# Patient Record
Sex: MALE | Race: WHITE
[De-identification: names, ages, dates, MRNs, and addresses within clinical notes are randomized per-mention and may not be internally consistent; named-entity substitution may affect disease eponyms.]

---

## 2020-07-23 NOTE — XMS REPORT
Continuity of Care Document

                            Created on:2020



Patient:LUCINA OLIVAS

Sex:Male

:1965

External Reference #:[id number]





Demographics







                          Address                   [address]



                                                    [place] [address]

 

                          Home Phone                [phone]

 

                          Work Phone                [phone]

 

                          Email Address             [email or web address]

 

                          Preferred Language        en

 

                          Marital Status            Unknown

 

                          Temple Affiliation     Unknown

 

                          Race                      Unknown

 

                          Additional Race(s)        Unavailable

 

                          Ethnic Group              Unknown









Author







                          Organization              [facility]

t

 

                          Address                   [address]



                                                    [place] [address]

 

                          Phone                     [phone]









Care Team Providers







                    Name                Role                Phone

 

                    Unavailable         Unavailable         Unavailable









Problems







       Condition Condition Condition Status Onset  Resolution Last   Treating Co

mments 

Source



       Name   Details Category        Date   Date   Treatment Clinician        



                                                 Date                 

 

       Osteoarthr Osteoarthr Problem Active                                    [facility]

[facility]



       itis of itis of                                                  [facility]



       right  right                                                   [facility]



       hand,  hand,                                                   l



       unspecifie unspecifie                                                  Ou

tpati



       d      d                                                       ent



       osteoarthr osteoarthr                                                  Cl

inics



       itis type itis type                                                  

 

       HTN, goal HTN, goal Problem Active                                    [facility]



       below  below                                                   [facility]



       140/90 140/90                                                  [facility]



                                                                      l



                                                                      [facility]



                                                                      [facility]

 

       Generalize Generalize Problem Active                                    C

[facility]



       d anxiety d anxiety                                                  [facility]

s -



       disorder disorder                                                  [facility]

a



                                                                      l



                                                                      [facility]



                                                                      [facility]

 

       Chronic Chronic Problem Active                                    [facility]



       obstructiv obstructiv                                                  Diya

kes -



       e      e                                                       [facility]



       pulmonary pulmonary                                                  l



       disease, disease,                                                  Outpat

i



       unspecifie unspecifie                                                  en

t



       d COPD d COPD                                                  Clinics



       type   type                                                    

 

       Bilateral Bilateral Problem Active                                    [facility]



       primary primary                                                  [facility]



       osteoarthr osteoarthr                                                  Me

moria



       itis of itis of                                                  l



       knee   knee                                                    [facility]



                                                                      [facility]

 

       Chronic Chronic Problem Active                                    [facility]



       hepatitis hepatitis                                                  [facility]

s -



       C without C without                                                  Servando

holden



       hepatic hepatic                                                  l



       coma   coma                                                    [facility]



                                                                      [facility]

 

       Tobacco Tobacco Problem Active                                    [facility]



       use    use                                                     [facility]



       disorder disorder                                                  [facility]

a



                                                                      l



                                                                      [facility]



                                                                      [facility]

 

       Primary Primary Diagnosis Active                                    [facility]

t



       osteoarthr osteoarthr                                                  Diya

kes -



       itis of itis of                                                  [facility]



       right knee right knee                                                  l



                                                                      [facility]



                                                                      [facility]

 

       Loose body Loose body Diagnosis Active                                   

 [facility]



       in knee, in knee,                                                  [facility] 

-



       right knee right knee                                                  Me

moria



                                                                      l



                                                                      [facility]



                                                                      [facility]

 

       Primary Primary Diagnosis Active                                    [facility]

t



       osteoarthr osteoarthr                                                  Diya

kes -



       itis of itis of                                                  [facility]



       left knee left knee                                                  l



                                                                      [facility]



                                                                      [facility]

 

       Pain in Pain in Diagnosis Active                                    [facility]

t



       joint of joint of                                                  [facility] 

-



       left knee left knee                                                  Serv[facility]

holden



                                                                      l



                                                                      [facility]



                                                                      [facility]

 

       Pain of Pain of Diagnosis Active                                    [facility]

t



       joint of joint of                                                  [facility] 

-



       left ankle left ankle                                                  Me

moria



       and foot and foot                                                  l



                                                                      [facility]



                                                                      [facility]

 

       Pain in Pain in Diagnosis Active                                    [facility]

t



       joint of joint of                                                  [facility] 

-



       right knee right knee                                                  Me

moria



                                                                      l



                                                                      [facility]



                                                                      [facility]



                                                                      Clinics







Allergies, Adverse Reactions, Alerts

This patient has no known allergies or adverse reactions.



Medications







       Ordered Filled Start  Stop   Current Ordering Indication Dosage Frequency

 Signature

                    Comments            Components          Source



     Medication Medication Date Date Medication? Clinician                (SIG) 

          



     Name Name                                                   

 

     Tramadol Tramadol 2019      Yes  Jian                1 tablet         

  [facility]



     HCl  HCl  0-22           Riddle                               [facility]



               00:00:                                              [facility]



               00                                                l



                                                                 [facility]



                                                                 [facility]

 

     ProAir HFA ProAir HFA       Yes  Jian                2 puffs as   

        [facility]



               -           Riddle                needed           [facility]



               00:00:                                              [facility]



               00                                                l



                                                                 [facility]



                                                                 [facility]

 

     Breo Breo  2020- No   Jian                1 puff           [facility]



     Ellipta Ellipta 8- 04-08      Riddle                               [facility]



               00:00: 00:00                                         [facility]



               00   :00                                          l



                                                                 [facility]



                                                                 [facility]



                                                                 [facility]

 

     Duexis Duexis           Yes  Jian                1 tablet           [facility]



                              [facility]                               [facility]



                                                                 [facility]



                                                                 [facility]



                                                                 ent



                                                                 Clinics

 

     Fish Oil Fish Oil           Yes  Jian                1 capsule          

 [facility]



     Omega-3 Omega-3                Riddle                               [facility]



                                                                 l



                                                                 [facility]



                                                                 [facility]



                                                                 [facility]

 

     Chantix Chantix           Yes  Jian                as             [facility]



     Starting Starting                Riddle                directed           January

es -



     Month Peter Month Peter                                                   [facility]

ia



                                                                 l



                                                                 [facility]



                                                                 [facility]



                                                                 [facility]

 

     [facility]           Yes  Jian                as             [facility]



                              Riddle                directed           [facility]



                                                                 [facility]



                                                                 [facility]



                                                                 [facility]

 

     Zinc Zinc           Yes  Jian                1 tablet           [facility]



                              [facility]iddle                               [facility]



                                                                 [facility]



                                                                 l



                                                                 [facility]



                                                                 [facility]



                                                                 Cl[facility]ics

 

     Multivitami Multivitami           Yes  Jian                as           

  [facility]



     n Adults n Adults                Riddle                directed           January

es -



     50+  50+                                                    [facility]



                                                                 l



                                                                 [facility]



                                                                 [facility]



                                                                 [facility]

 

     Fluocinolon Fluocinolon           Yes  Jian                1            

  [facility]



     e Acetonide e Acetonide                Riddle                applicatio      

     [facility]



                                                  n to           [facility]



                                                  affected           l



                                                  area           [facility]



                                                                 [facility]



                                                                 [facility]

 

     Gabapentin Gabapentin           Yes  Jian                1 capsule      

     [facility]



                              [facility]                               [facility]



                                                                 [facility]



                                                                 l



                                                                 [facility]



                                                                 [facility]



                                                                 Clinics

 

     Tumersaid Tumersaid           Yes  Jian                as             [facility]

[facility]



                              Riddle                directed           [facility]



                                                                 [facility]



                                                                 l



                                                                 [facility]



                                                                 [facility]



                                                                 [facility]

 

     Chantix Chantix           Yes  Jian                1 tablet           [facility]

[facility]



     Continuing Continuing                Riddle                               January

es -



     Month Peter Month Peter                                                   [facility]

ia



                                                                 l



                                                                 [facility]



                                                                 ent



                                                                 Clinics

 

     Acetaminoph Acetaminoph           Yes  Jian                1 capsule    

       [facility]



     en   en                  Riddle                as needed           [facility]

 

     Healthy Healthy           Yes  Jian                as             [facility]



     Eyes/Lutein Eyes/Lutein                Riddle                directed        

   [facility]

 

     Lidocaine Lidocaine           Yes  Jian                as             [facility]

[facility]



     HCl  HCl                 Riddle                directed           [facility]

 

     Calcipotrie Calcipotrie           Yes  Jian                1            

  [facility]



     ne   ne                  Riddle                applicatio           [facility]



                                                  [facility]           [facility]



                                                                 [facility]



                                                                 [facility]







Procedures

This patient has no known procedures.



Encounters







        Start   End     Encounter Admission Attending Care    Care    Encounter 

Source



        Date/Time Date/Time Type    Type    Clinicians Facility Department ID   

   

 

        2020-07-15 2020-07-15 Outpatient                 [facility]

[id number] [facility]



        12:00:00 12:00:00                         t Bone  Bone and         [facility]

 -



                                                and Joint Joint           [facility]

a



                                                [facility]         ent



                                                                        Clinics

 

        2020-07-15 2020-07-15 Outpatient                 [facility]

[id number] [facility]



        10:16:00 10:16:00                         t Bone  B[facility] and         [facility]

 -



                                                and Joint Joint           [facility]

a



                                                [facility]         ent



                                                                        [facility]

 

        2020 Outpatient                 [facility]

[id number] [facility]



        08:00:00 08:00:00                         t Bone  Bone and         [facility]

 -



                                                and Joint Joint           [facility]

a



                                                [facility]         ent



                                                                        Clinics

 

        2020 Outpatient                 [facility]

[id number] [facility]



        09:53:00 09:53:00                         t [place]   [facility]



                                                                        ent



                                                                        Clinics

 

        2020 Outpatient                 [facility]

[id number] [facility]



        10:28:00 10:28:00                         t Appbyme



                                                [facility]



                                                                        ent



                                                                        Clinics

 

        2020 Outpatient                 [facility]

[id number] [facility]



        08:30:00 08:30:00                         t Appbyme



                                                [facility]



                                                                        ent



                                                                        Clinics

 

        2020 2020 Outpatient                 [facility]

[id number] [facility]



        14:27:00 14:27:00                         t Bone  Bone and         [facility]

 -



                                                and Joint Joint           [facility]

a



                                                Clinic of Clinic of         [facility]         ent



                                                                        Clinics

 

        2020 Outpatient                 [facility]

[id number] [facility]



        10:00:00 10:00:00                         t Bone  B[facility] and         [facility]

 -



                                                and Joint Joint           [facility]

a



                                                [facility]         ent



                                                                        Clinics

 

        2020 Outpatient                 [facility]

[id number] [facility]



        10:15:00 10:15:00                         t Oak   [facility]

s [employer]   [facility]



                                                                        ent



                                                                        Clinics

 

        2019 Outpatient                 [facility]

[id number] [facility]



        11:38:00 11:38:00                         t Oak   [facility]   [facility]



                                                                        ent



                                                                        Clinics

 

        2019 Outpatient                 [facility]

[id number] [facility]



        10:38:00 10:38:00                         t Oak   [facility]

s -



                                                [address]   [facility]



                                                                        ent



                                                                        Clinics

 

        2019 Outpatient                 [facility]

[id number] [facility]



        08:30:00 08:30:00                         t Bone  B[facility] and         [facility]

 -



                                                and Joint Joint           [facility]

a



                                                [facility]         [facility]         ent



                                                                        Clinics

 

        2019 Outpatient                 [facility]

[id number] [facility]



        14:20:00 14:20:00                         t Oak   [employer]

s [employer]apo   [facility]



                                                                        ent



                                                                        Clinics

 

        2019 Outpatient                 [facility]

[id number] [facility]



        10:00:00 10:00:00                         t Oak   [facility]

s -



                                                [address]   [facility]



                                                                        ent



                                                                        Clinics

 

        2019 Outpatient                 [facility]

[id number] [facility]



        09:45:00 09:45:00                         t Oak   [facility]

s [facility]   [facility]



                                                                        ent



                                                                        Clinics

 

        2019 Outpatient                 [facility]

[id number] [facility]



        08:30:00 08:30:00                         t Bone  [facility]         [facility]

 -



                                                [facility]           [facility]

a



                                                [facility]         ent



                                                                        Clinics

 

        2019-10-22 2019-10-22 Outpatient                 [facility]

[id number] [facility]



        09:30:00 09:30:00                         t Bone  [facility]         [facility]

 -



                                                [facility]           [facility]

a



                                                [facility]         ent



                                                                        Clinics

 

        2019-10-16 2019-10-16 Outpatient                 [facility]

[id number] [facility]



        11:00:00 11:00:00                         t Bone  [facility]         [facility]

 -



                                                [facility]           [facility]

a



                                                [facility]         ent



                                                                        [facility]

 

        2019-10-08 2019-10-08 Outpatient                 [facility]

[id number] [facility]



        13:52:00 13:52:00                         t Oak   [address]         [facility]

s -



                                                [facility]



                                                                        ent



                                                                        [facility]

 

        2019-10-07 2019-10-07 Outpatient                 [facility]

[id number] [facility]



        10:30:00 10:30:00                         [facility]



                                                [facility]



                                                                        ent



                                                                        [facility]

 

        2019-10-03 2019-10-03 Outpatient                 [facility]

[id number] [facility]



        18:36:00 18:36:00                         t Oak   [facility]         [facility]

s -



                                                [facility]



                                                                        ent



                                                                        [facility]

 

        2019-10-03 2019-10-03 Outpatient                 [facility] [address]

[id number] [facility]



        09:48:00 09:48:00                         t [facility]   [facility]

s [facility]



                                                                        ent



                                                                        [facility]

 

        2019-10-02 2019-10-02 Outpatient                 [facility]

[id number] [facility]



        08:00:00 08:00:00                         t Bone  [facility]         [facility]

 -



                                                [facility]           [facility]

a



                                                [facility]         ent



                                                                        Clinics

 

        2019 Outpatient                 [facility]

[id number] [facility]



        08:00:00 08:00:00                         t Bone  [facility]         [facility]

 -



                                                and [facility]           [facility]

a



                                                [facility]         ent



                                                                        [facility]







Results

This patient has no known results.

## 2020-07-23 NOTE — RAD REPORT
EXAM DESCRIPTION:  CT - Chest For Pe Angio - 7/23/2020 9:26 am

 

CLINICAL HISTORY:  Chest pain.

Dyspnea;Cough;Fever

 

COMPARISON:  No comparisons

 

TECHNIQUE:  CT angiogram of the pulmonary arteries was performed with MIP.

 

All CT scans are performed using dose optimization technique as appropriate and may include automated
 exposure control or mA/KV adjustment according to patient size.

 

FINDINGS:  No evidence of pulmonary thromboembolism.

 

No acute aortic finding demonstrated.

 

Prominent bullous emphysematous changes are present. Moderate airspace opacity is present in the righ
t upper lobe laterally compatible with pneumonia.

 

No significant pericardial or pleural fluid.

 

No concerning bony finding.

 

IMPRESSION:  No evidence of pulmonary thromboembolism.

 

Moderate pneumonia right upper lobe laterally.

 

Prominent bullous emphysema.

## 2020-07-23 NOTE — XMS REPORT
[facility]

                            Created on:July 15, 2020



Patient:Jefferson Rangel

Sex:Male

:1965

External Reference #:[id number]





Demographics







                          Address                   [address] [place] [address]

 

                          Phone                     [phone]

 

                          Preferred Language        en

 

                          Marital Status            Unknown

 

                          Rastafari Affiliation     Unknown

 

                          Race                      Unknown

 

                          Ethnic Group              Unknown









Author







                          Organization              [employer]









Care Team Providers







                    Name                Role                Phone

 

                    Jian Riddle       Provider Role       Unavailable









Allergies

No Known Allergies



Problems







             Problem Type Condition    Code         Onset Dates  Condition Statu

s

 

             Problem      Generalized anxiety disorder F41.1                    

 Active

 

             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                  

   Active



                          knees                                  

 

             Problem      Chronic obstructive pulmonary J44.9                   

  Active



                          disease, unspecified COPD type                        

   

 

             Problem      Tobacco use disorder F17.200                   Active

 

             Problem      Chronic hepatitis C without hepatic B18.2             

        Active



                          coma                                   

 

             Problem      Loose body in knee, right knee M23.41                 

   Active

 

             Problem      Primary osteoarthritis of left knee M17.12            

        Active

 

             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             

        Active



                          knee                                   

 

             Problem      Arthritis of hand, right M19.041                   Act

brendan

 

             Problem      HTN, goal below 140/90 I10                       Activ

e

 

             Problem      Primary osteoarthritis of right M17.11                

    Active



                          knee                                   

 

             Problem      Osteoarthritis of right hand, M19.041                 

  Active



                          unspecified osteoarthritis type                       

    







Medications

No Known Medications



Results

No Known Results



Summary Purpose

[employer] Submission

## 2020-07-23 NOTE — XMS REPORT
[facility]

                            Created on:July 15, 2020



Patient:Jefferson Rangel

Sex:Male

:1965

External Reference #:[id number]





Demographics







                          Address                   [address] [place] [address]

 

                          Phone                     [phone]

 

                          Preferred Language        en

 

                          Marital Status            Unknown

 

                          Rastafarian Affiliation     Unknown

 

                          Race                      Unknown

 

                          Ethnic Group              Unknown









Author







                          Organization              [employer]









Care Team Providers







                    Name                Role                Phone

 

                    Lalo Jorge A       Provider Role       Unavailable









Allergies

No Known Allergies



Problems







             Problem Type Condition    Code         Onset Dates  Condition Statu

s

 

             Problem      Generalized anxiety disorder F41.1                    

 Active

 

             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                  

   Active



                          knees                                  

 

             Problem      Chronic obstructive pulmonary J44.9                   

  Active



                          disease, unspecified COPD type                        

   

 

             Problem      Tobacco use disorder F17.200                   Active

 

             Problem      Chronic hepatitis C without hepatic B18.2             

        Active



                          coma                                   

 

             Problem      Loose body in knee, right knee M23.41                 

   Active

 

             Problem      Primary osteoarthritis of left knee M17.12            

        Active

 

             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             

        Active



                          knee                                   

 

             Problem      Arthritis of hand, right M19.041                   Act

brendan

 

             Problem      HTN, goal below 140/90 I10                       Activ

e

 

             Problem      Primary osteoarthritis of right M17.11                

    Active



                          knee                                   

 

             Problem      Osteoarthritis of right hand, M19.041                 

  Active



                          unspecified osteoarthritis type                       

    







Medications

No Known Medications



Results

No Known Results



Summary Purpose

[employer] Submission

## 2020-07-23 NOTE — XMS REPORT
[facility]

                            Created on:2020



Patient:Jefferson Rangel

Sex:Male

:1965

External Reference #:[id number]





Demographics







                          Address                   [address] [place] [address]

 

                          Phone                     [phone]

 

                          Preferred Language        en

 

                          Marital Status            Unknown

 

                          Lutheran Affiliation     Unknown

 

                          Race                      Unknown

 

                          Ethnic Group              Unknown









Author







                          Organization              [employer]









Care Team Providers







                    Name                Role                Phone

 

                    Lalo Jorge A       Provider Role       Unavailable









Allergies, Adverse Reactions, Alerts







                    Substance           Reaction            Event Type

 

                    N.K.D.A.            Info Not Available  Non Drug Allergy







Problems







             Problem Type Condition    Code         Onset Dates  Condition Statu

s

 

             Problem      Tobacco use disorder F17.200                   Active

 

             Problem      Chronic obstructive pulmonary J44.9                   

  Active



                          disease, unspecified COPD type                        

   

 

             Problem      Osteoarthritis of right hand, M19.041                 

  Active



                          unspecified osteoarthritis type                       

    

 

             Assessment   Hand pain, right M79.641                   Active

 

             Problem      Arthritis of hand, right M19.041                   Act

brendan

 

             Problem      Primary osteoarthritis of right M17.11                

    Active



                          knee                                   

 

             Problem      Generalized anxiety disorder F41.1                    

 Active

 

             Problem      Chronic hepatitis C without B18.2                     

Active



                          hepatic coma                           

 

             Problem      HTN, goal below 140/90 I10                       Activ

e

 

             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                  

   Active



                          knees                                  

 

             Assessment   Chronic hepatitis C without B18.2                     

Active



                          hepatic coma                           

 

             Assessment   Generalized anxiety disorder F41.1                    

 Active

 

             Assessment   Tobacco use disorder F17.200                   Active

 

             Assessment   Closed fracture of left ankle with S82.892D           

       Active



                          routine healing, subsequent                           



                          encounter                              

 

             Assessment   Weakness     R53.1                     Active

 

             Assessment   Chronic obstructive pulmonary J44.9                   

  Active



                          disease, unspecified COPD type                        

   

 

             Assessment   History of falling Z91.81                    Active

 

             Assessment   HTN, goal below 140/90 I10                       Activ

e

 

             Assessment   Osteoarthritis of right hand, M19.041                 

  Active



                          unspecified osteoarthritis type                       

    

 

             Assessment   Primary osteoarthritis of both M17.0                  

   Active



                          knees                                  







Medications







        Medication Code    Code    Instructions Start   End     Status  Dosage



                System                  Date    Date            

 

        ProAir HFA NDC     83351633957 108 (90 Base)                 Active  2 p

uffs as



                                MCG/ACT                         needed



                                Inhalation                         



                                every 6 hrs                         

 

        Fluocinolone NDC     23805034104 0.01 %                  Active  1 appli

cation



        Acetonide                 Externally                         to affected



                                Twice a day                         area

 

        Ginkoba NDC     60292050351 40 MG Orally                 Active  as dire

cted

 

        Multivitamin NDC     74287645677 - Orally                 Active  as dir

ected



        Adults 50+                                                 

 

        Zinc    NDC     30829638603 50 MG Orally                 Active  1 table

t



                                Once a day                         

 

        Chantix Starting NDC     83622287112 0.5 MG X 11 &                 Activ

e  as directed



        Month Peter                 1 MG X 42                         



                                Orally use as                         



                                directed                         

 

        Healthy NDC     07921144962 - Orally                 Active  as directed



        Eyes/Lutein                                                 

 

        Lidocaine HCl NDC     25[address]-73814 2.5 %                   Active  as dir

ected



                                Externally                         

 

        Tumersaid NDC     89896883559 - Orally                 Active  as direct

ed

 

        Duexis  NDC     54852075242 800-26.6 MG                 Active  1 tablet



                                Orally Three                         



                                times a day                         



                                PRN PAIN                         

 

        Breo Ellipta NDC     04022199474 200-25 MCG/INH                 Active  

1 puff



                                Inhalation                         



                                Once a day                         

 

        Fish Oil [place]-3 NDC     32173142983 1000 MG Orally                 Acti

ve  1 capsule



                                Once a day                         

 

        Acetaminophen NDC     [address]-73 500 MG Orally                 Active

  1 capsule as



                                every 6 hrs                         needed

 

        Chantix NDC     60987237277 1 MG Orally                 Active  1 tablet



        Continuing Month                 Twice a day                         



        Peter                                                     

 

        Tramadol HCl NDC     52556978691 50 MG PO Q 6 Oct 22,         Active  1 

tablet



                                hrs PRN Pain 2019                    

 

        Calcipotriene NDC     21828027254 0.005 %                 Active  1 appl

ication



                                Externally                         to affected



                                Twice a day                         area

 

        Gabapentin NDC     57764949405 600 MG Orally                 Active  1 c

apsule



                                TID                             







Results

No Known Results



Summary Purpose

eClinicalWorks Submission

## 2020-07-23 NOTE — ER
Nurse's Notes                                                                                     

 [facility]                                                                 

Name: Jefferson Rangel                                                                           

Age: 55 yrs                                                                                       

Sex: Male                                                                                         

: 1965                                                                                   

MRN: [id number]                                                                                   

Arrival Date: 2020                                                                          

Time: 07:33                                                                                       

Account#: [id number]                                                                            

Bed 15                                                                                            

Private MD: Jorge A May                                                                         

Diagnosis: Pneumonia, unspecified organism-RUL;Emphysema                                          

                                                                                                  

Presentation:                                                                                     

                                                                                             

07:43 Chief complaint: Patient states: painful cough, shortness of breath and fever that      ss  

      began 6 days ago. Pt also reports bloody sputum. Coronavirus screen: Patient reports a      

      cough. Patient reports shortness of breath or difficulty breathing. Patient reports a       

      measured and/or subjective temperature greater than 100.4F. Patient denies travel on a      

      cruise ship or to a country the [facility] currently lists as an affected area. Patient denies     

      contact with known and/or suspected case of COVID-19. Ebola Screen: Patient denies          

      exposure to infectious person. Patient denies travel to an Ebola-affected area in the       

      21 days before illness onset. Initial Sepsis Screen: Does the patient have a suspected      

      source of infection? No. Patient's initial sepsis screen is negative. Risk Assessment:      

      Do you want to hurt yourself or someone else? Patient reports no desire to harm self or     

      others. Onset of symptoms was 2020.                                                

07:43 Method Of Arrival: Ambulatory                                                           ss  

07:43 Acuity: CASEY 3                                                                           ss  

                                                                                                  

Historical:                                                                                       

- Allergies:                                                                                      

07:46 No Known Allergies;                                                                     ss  

- Home Meds:                                                                                      

08:21 gabapentin 600 mg oral tab 3 times per day for Neuropathic Pain [Active]; Albuterol     jr10

      Nebulizer [Active]; Breo Ellipta inhalation inhalation [Active];                            

- PMHx:                                                                                           

07:46 Arthritis; COPD;                                                                        ss  

- PSHx:                                                                                           

07:46 neck; Knee surgery; feet; ankle; Tonsillectomy;                                         ss  

                                                                                                  

- Immunization history:: Adult Immunizations up to date.                                          

- Social history:: Smoking status: Patient/guardian denies using tobacco, Stopped _               

  months ago 2.                                                                                   

                                                                                                  

                                                                                                  

Screenin:00 Abuse screen: Denies threats or abuse. Denies injuries from another. Nutritional        jr10

      screening: No deficits noted. Tuberculosis screening: No symptoms or risk factors           

      identified. Fall Risk No fall in past 12 months (0 pts). No secondary diagnosis (0          

      pts). IV access (20 points). Ambulatory Aid- None/Bed Rest/Nurse Assist (0 pts). Gait-      

      Normal/Bed Rest/Wheelchair (0 pts) Mental Status- Oriented to own ability (0 pts).          

                                                                                                  

Assessment:                                                                                       

08:00 Reassessment: urinal placed at bedside, pt instructed to call nurse when urine specimen jr10

      is obtained. General: Appears in no apparent distress. slender, Behavior is restless.       

      Pain: Complains of pain in reports "chronic pain all over". Neuro: No deficits noted.       

      Cardiovascular: Reports chest pain, right side cp that radiates to right posterior          

      shoulder x1 week; reports pain worse with cough and inspiration Capillary refill < 3        

      seconds Pulses are all present. Edema is absent. Rhythm is regular. Respiratory:            

      Reports shortness of breath cough that is pain with cough pain with respiration Airway      

      is patent Respiratory effort is even, unlabored, Respiratory pattern is regular,            

      symmetrical, Sputum is thin, brown Breath sounds are diminished in left posterior upper     

      lobe and left posterior lower lobe Onset: The symptoms/episode began/occurred 1 week        

      ago, the patient has moderate shortness of breath. GI: No deficits noted. Patient           

      currently denies diarrhea, nausea, vomiting. : No deficits noted. EENT: No deficits       

      noted. Derm: No deficits noted. Musculoskeletal: No deficits noted.                         

                                                                                                  

Vital Signs:                                                                                      

07:43 Resp 19; Temp 98.5(TE); Weight 68.04 kg; Height 5 ft. 8 in. (172.72 cm); Pain 7/10;     ss  

07:58  / 95; Pulse 82; Pulse Ox 96% on R/A;                                             ss  

08:00  / 95; Pulse 84; Resp 23; Temp 100.2(O); Pulse Ox 96% ; Pain 8/10;                jr10

08:42  / 90; Pulse 78; Resp 20; Pulse Ox 97% on R/A;                                    jr10

09:58  / 85; Pulse 92; Resp 17; Pulse Ox 97% on R/A;                                    jr10

10:15  / 92; Pulse 86; Resp 20; Temp 99.9(O); Pulse Ox 97% on R/A;                      jr10

07:43 Body Mass Index 22.81 (68.04 kg, 172.72 cm)                                               

                                                                                                  

ED Course:                                                                                        

07:33 Patient arrived in ED.                                                                  mr  

07:33 Jorge A May,  is Private Physician.                                                 mr  

07:38 Clif Lynn MD is Attending Physician.                                              kdr 

07:45 Triage completed.                                                                       ss  

07:45 Inserted saline lock: 20 gauge in right forearm, using aseptic technique. IV is patent, jr10

      is intact, Flushed.                                                                         

07:46 Arm band placed on right wrist.                                                         ss  

08:00 Patient has correct armband on for positive identification. Placed in gown. Bed in low  jr10

      position. Call light in reach. Side rails up X2. Cardiac monitor on. Pulse ox on. NIBP      

      on.                                                                                         

08:16 Aurora Carlisle, RN is Primary Nurse.                                                   jr10

08:24 Chest Single View XRAY In Process Unspecified.                                          EDMS

08:30 No provider procedures requiring assistance completed.                                  jr10

09:26 CT Chest For PE Angio In Process Unspecified.                                           EDMS

10:00 Jorge A May DO is Referral Physician.                                                kdr 

10:18 Amylase, Serum Sent.                                                                    jr10

10:18 Basic Metabolic Panel Sent.                                                             jr10

10:23 IV discontinued, No redness/swelling at site. Pressure dressing applied.                jr10

                                                                                                  

Administered Medications:                                                                         

08:10 Drug: NS 0.9% (30 ml/kg) 30 ml/kg Route: IV; Rate: bolus; Site: right forearm;          jr10

09:30 Follow up: Response: No adverse reaction; IV Status: Completed infusion                 jr10

09:12 Drug: Rocephin - (cefTRIAXone) 1 grams {Note: slow IVP per pharmacy protocol.} Route:   jr10

      IVPB; Infused Over: 30 mins; Site: right forearm;                                           

09:15 Follow up: IV Status: Completed infusion                                                jr10

10:20 Follow up: Response: No adverse reaction                                                jr10

09:12 Drug: Zithromax 500 mg Route: PO;                                                       jr10

10:20 Follow up: Response: No adverse reaction                                                jr10

10:15 Drug: Norco 10 mg-325 mg 1 tabs Route: PO;                                              jr10

10:20 Follow up: Response: No adverse reaction                                                jr10

                                                                                                  

                                                                                                  

Outcome:                                                                                          

10:00 Discharge ordered by MD.                                                                kdr 

10:22 Discharged to home ambulatory.                                                          jr10

10:22 Condition: stable                                                                           

10:22 Discharge instructions given to patient, Instructed on discharge instructions, follow       

      up and referral plans. Demonstrated understanding of instructions, follow-up care,          

      medications, Prescriptions given X 2.                                                       

10:23 Patient left the ED.                                                                    jr10

                                                                                                  

Addendum:                                                                                         

2020                                                                                        

     11:17 Addendum: COVID-19 Result: Negative result given to RN to notify pt. Contacted by: MARYLU Poole RN. Notified pt of negative COVID 19 swab results. Pt advised that even with

           a negative test result they should remain in isolation until symptom free for 3 days   

           without medication. Pt also advised to return to the ED for worsening symptoms.        

                                                                                                  

Signatures:                                                                                       

Dispatcher [employer]                           EDMS                                                 

Shikha Poole, KAVYA HOFF   [id number]                                                  

Clif Lynn MD MD kdr Rivera, Mary mr Smirch, Shelby, RN RN                                                      

Aurora Carlisle RN                     RN   jr10                                                 

                                                                                                  

**************************************************************************************************

## 2020-07-23 NOTE — RAD REPORT
EXAM DESCRIPTION:  RAD - Chest Single View - 7/23/2020 8:24 am

 

CLINICAL HISTORY:  Chest pain;Cough

Chest pain.

 

COMPARISON:  Chest Pa And Lat (2 Views) dated 12/17/2019

 

FINDINGS:  Portable technique limits examination quality.

 

Emphysematous changes are present throughout the lungs. A bullae is present in the right upper lobe l
aterally. Moderate airspace opacity is seen in the right upper lobe likely representing pneumonia. Th
e heart is normal in size. No displaced fractures.

 

IMPRESSION:  Moderate right upper lobe pneumonia.

## 2020-07-23 NOTE — EDPHYS
Physician Documentation                                                                           

 [facility]                                                                 

Name: Jefferson Rangel                                                                           

Age: 55 yrs                                                                                       

Sex: Male                                                                                         

: 1965                                                                                   

MRN: [id number]                                                                                   

Arrival Date: 2020                                                                          

Time: 07:33                                                                                       

Account#: [id number]                                                                            

Bed 15                                                                                            

Private MD: Lalo [facility]                                                                         

ED Physician Clif Lynn                                                                       

HPI:                                                                                              

                                                                                             

08:13 This 55 yrs old  Male presents to ER via Ambulatory with complaints of Fever,  kdr 

      Cough, Breathing Difficulty.                                                                

08:13 The patient or guardian reports cough, that is intermittent, described as mild, with    kdr 

      productive sputum, Brown. Onset: The symptoms/episode began/occurred yesterday.             

      Severity of symptoms: At their worst the symptoms were mild, in the emergency               

      department the symptoms are unchanged. Modifying factors: The symptoms are alleviated       

      by nothing, the symptoms are aggravated by Coughing. Associated signs and symptoms:         

      Pertinent positives: chest pain, with cough, with movement, with breathing, fever. The      

      patient has not experienced similar symptoms in the past, Does feel somewhat similar to     

      when he had a PTX. The patient has not recently seen a physician.                           

                                                                                                  

Historical:                                                                                       

- Allergies:                                                                                      

07:46 No Known Allergies;                                                                     ss  

- Home Meds:                                                                                      

08:21 gabapentin 600 mg oral tab 3 times per day for Neuropathic Pain [Active]; Albuterol     jr10

      Nebulizer [Active]; Breo Ellipta inhalation inhalation [Active];                            

- PMHx:                                                                                           

07:46 Arthritis; COPD;                                                                        ss  

- PSHx:                                                                                           

07:46 neck; Knee surgery; feet; ankle; Tonsillectomy;                                         ss  

                                                                                                  

- Immunization history:: Adult Immunizations up to date.                                          

- Social history:: Smoking status: Patient/guardian denies using tobacco, Stopped _               

  months ago 2.                                                                                   

                                                                                                  

                                                                                                  

ROS:                                                                                              

08:13 Constitutional: Negative for objective fever, chills, and weight loss - he has had      kdr 

      subjective fever Eyes: Negative for injury, pain, redness, and discharge, Neck:             

      Negative for injury, pain, and swelling.                                                    

08:13 Cardiovascular: Positive for chest pain, with cough, with movement, of the anterior         

      aspect of right upper chest, right lateral anterior chest and right lateral posterior       

      chest.                                                                                      

08:13 Respiratory: Positive for cough, Brown, dyspnea on exertion, shortness of breath,           

      Negative for hemoptysis, orthopnea, pleurisy.                                               

                                                                                                  

Exam:                                                                                             

08:13 Constitutional:  This is a well developed, well nourished patient who is awake, alert,  kdr 

      and in no acute distress. Head/Face:  Normocephalic, atraumatic. Eyes:  Pupils equal        

      round and reactive to light, extra-ocular motions intact.  Lids and lashes normal.          

      Conjunctiva and sclera are non-icteric and not injected.  Cornea within normal limits.      

      Periorbital areas with no swelling, redness, or edema. Neck:  Trachea midline, no           

      thyromegaly or masses palpated, and no cervical lymphadenopathy.  Supple, full range of     

      motion without nuchal rigidity, or vertebral point tenderness.  No Meningismus.             

      Chest/axilla:  Normal chest wall appearance and motion.  Nontender with no deformity.       

      No lesions are appreciated. Back:  No spinal tenderness.  No costovertebral tenderness.     

       Full range of motion. Skin:  Warm, dry with normal turgor.  Normal color with no           

      rashes, no lesions, and no evidence of cellulitis. MS/ Extremity:  Pulses equal, no         

      cyanosis.  Neurovascular intact.  Full, normal range of motion. Neuro:  Awake and           

      alert, GCS 15, oriented to person, place, time, and situation.  Cranial nerves II-XII       

      grossly intact.  Motor strength 5/5 in all extremities.  Sensory grossly intact.            

      Cerebellar exam normal.  Normal gait. Psych:  Awake, alert, with orientation to person,     

      place and time.  Behavior, mood, and affect are within normal limits.                       

08:13 Cardiovascular: Rate: normal, Edema: is not appreciated, JVD: is not appreciated.           

08:13 ECG was reviewed by the Attending Physician.                                                

08:13 Respiratory: the patient does not display signs of respiratory distress,  Respirations:     

      normal.                                                                                     

                                                                                                  

Vital Signs:                                                                                      

07:43 Resp 19; Temp 98.5(TE); Weight 68.04 kg; Height 5 ft. 8 in. (172.72 cm); Pain 7/10;     ss  

07:58  / 95; Pulse 82; Pulse Ox 96% on R/A;                                             ss  

08:00  / 95; Pulse 84; Resp 23; Temp 100.2(O); Pulse Ox 96% ; Pain 8/10;                jr10

08:42  / 90; Pulse 78; Resp 20; Pulse Ox 97% on R/A;                                    jr10

09:58  / 85; Pulse 92; Resp 17; Pulse Ox 97% on R/A;                                    jr10

10:15  / 92; Pulse 86; Resp 20; Temp 99.9(O); Pulse Ox 97% on R/A;                      jr10

07:43 Body Mass Index 22.81 (68.04 kg, 172.72 cm)                                             ss  

                                                                                                  

MDM:                                                                                              

10:00 Patient medically screened.                                                             kdr 

11:57 Data reviewed: vital signs, nurses notes, lab test result(s), radiologic studies.       kdr 

      Counseling: I had a detailed discussion with the patient and/or guardian regarding: the     

      historical points, exam findings, and any diagnostic results supporting the                 

      discharge/admit diagnosis, lab results, radiology results, the need for outpatient          

      follow up.                                                                                  

                                                                                                  

                                                                                             

07:54 Order name: Amylase, Serum                                                              kdr 

                                                                                             

07:54 Order name: Basic Metabolic Panel                                                       kdr 

                                                                                             

07:54 Order name: Blood Culture Adult (2)                                                     kdr 

                                                                                             

07:54 Order name: CBC with Diff; Complete Time: 15:31                                         kdr 

                                                                                             

07:54 Order name: Ckmb; Complete Time: 08:35                                                  kdr 

                                                                                             

07:54 Order name: CPK; Complete Time: 08:35                                                   kdr 

                                                                                             

07:54 Order name: Lactate; Complete Time: 08:35                                               kdr 

                                                                                             

07:54 Order name: LFT's; Complete Time: 08:35                                                 kdr 

                                                                                             

07:54 Order name: Lipase; Complete Time: 08:35                                                kdr 

                                                                                             

07:54 Order name: Procalcitonin; Complete Time: 15:31                                         kdr 

                                                                                             

07:54 Order name: Protime (+inr); Complete Time: 08:35                                        kdr 

                                                                                             

07:54 Order name: Ptt, Activated; Complete Time: 08:35                                        kdr 

                                                                                             

07:54 Order name: Troponin (emerg Dept Use Only); Complete Time: 08:35                        kdr 

                                                                                             

07:54 Order name: Urine Microscopic Only; Complete Time: 15:31                                kdr 

                                                                                             

07:54 Order name: Chest Single View XRAY; Complete Time: 15:31                                kdr 

                                                                                             

07:54 Order name: Accucheck; Complete Time: 08:18                                             kdr 

                                                                                             

07:54 Order name: Cardiac monitoring; Complete Time: 08:07                                    kdr 

                                                                                             

07:54 Order name: EKG - Nurse/Tech; Complete Time: 08:18                                      kdr 

                                                                                             

07:54 Order name: IV Saline Lock - Large Bore; Complete Time: 08:18                           kdr 

                                                                                             

07:54 Order name: COVID-19; Complete Time: 15:31                                              kdr 

                                                                                             

07:55 Order name: Amylase; Complete Time: 08:35                                               EDMS

                                                                                             

07:55 Order name: Basic Metabolic Panel; Complete Time: 08:35                                 EDMS

                                                                                             

08:26 Order name: Glucose, Ancillary Testing; Complete Time: 08:35                            EDMS

                                                                                             

08:48 Order name: Manual Differential; Complete Time: 15:31                                   EDMS

                                                                                             

09:00 Order name: CT Chest For PE Angio; Complete Time: 15:31                                 kdr 

                                                                                             

09:59 Order name: Urine Dipstick--Ancillary (enter results); Complete Time: 15:31             bd  

                                                                                             

07:54 Order name: Labs collected and sent; Complete Time: 08:18                               kdr 

                                                                                             

07:54 Order name: O2 Per Protocol; Complete Time: 08:18                                       kdr 

                                                                                             

07:54 Order name: O2 Sat Monitoring; Complete Time: 08:18                                     kdr 

                                                                                             

07:54 Order name: Urine Dipstick-Ancillary (obtain specimen); Complete Time: 10:17            kdr 

                                                                                                  

EC:13 Rate is 78 beats/min. Rhythm is regular, Sinus Rhythm with No ectopy. QRS Axis is       kdr 

      Normal. OH interval is normal. QRS interval is normal. QT interval is normal. Clinical      

      impression: NSR w/ Non-specific ST/T Changes.                                               

                                                                                                  

Administered Medications:                                                                         

08:10 Drug: NS 0.9% (30 ml/kg) 30 ml/kg Route: IV; Rate: bolus; Site: right forearm;          jr10

09:30 Follow up: Response: No adverse reaction; IV Status: Completed infusion                 jr10

09:12 Drug: Rocephin - (cefTRIAXone) 1 grams {Note: slow IVP per pharmacy protocol.} Route:   jr10

      IVPB; Infused Over: 30 mins; Site: right forearm;                                           

09:15 Follow up: IV Status: Completed infusion                                                jr10

10:20 Follow up: Response: No adverse reaction                                                jr10

09:12 Drug: Zithromax 500 mg Route: PO;                                                       jr10

10:20 Follow up: Response: No adverse reaction                                                jr10

10:15 Drug: Norco 10 mg-325 mg 1 tabs Route: PO;                                              jr10

10:20 Follow up: Response: No adverse reaction                                                jr10

                                                                                                  

                                                                                                  

Disposition:                                                                                      

20 10:00 Discharged to Home. Impression: Pneumonia, unspecified organism - RUL,             

  Emphysema.                                                                                      

- Condition is Stable.                                                                            

- Discharge Instructions: Community-Acquired Pneumonia, Adult, Easy-to-Read.                      

- Prescriptions for Tylenol- Codeine #3 300-30 mg Oral Tablet - take 2 tablets by ORAL            

  route every 6 hours As needed; 8 tablet. Zithromax Z- Peter 250 mg Oral Tablet - take 1           

  tablet by ORAL route once daily for 4 days; 4 tablet.                                           

- Medication Reconciliation Form, Thank You Letter, Antibiotic Education, Prescription            

  Opioid Use form.                                                                                

- Follow up: Jorge A May DO; When: 2 - 3 days; Reason: If symptoms return, Further             

  diagnostic work-up, Recheck today's complaints, Continuance of care, Re-evaluation by           

  your physician.                                                                                 

- Problem is new.                                                                                 

- Symptoms have improved.                                                                         

                                                                                                  

                                                                                                  

                                                                                                  

Signatures:                                                                                       

Dispatcher [employer]                           EDMS                                                 

Clif Lynn MD MD   kdr                                                  

Kajal Butler RN                      RN   ss                                                   

Aurora Carlisle RN                     RN   jr10                                                 

                                                                                                  

Corrections: (The following items were deleted from the chart)                                    

10: 10:00 2020 10:00 Discharged to Home. Impression: Pneumonia, unspecified organism  jr10

      - RUL; Emphysema. Condition is Stable. Forms are Medication Reconciliation Form, Thank      

      You Letter, Antibiotic Education, Prescription Opioid Use. Follow up: Jorge A May;         

      When: 2 - 3 days; Reason: If symptoms return, Further diagnostic work-up, Recheck           

      today's complaints, Continuance of care, Re-evaluation by your physician. Problem is        

      new. Symptoms have improved. kdr                                                            

                                                                                                  

**************************************************************************************************

## 2020-07-23 NOTE — XMS REPORT
[facility]

                            Created on:2020



Patient:Jefferson Rangel

Sex:Male

:1965

External Reference #:[id number]





Demographics







                          Address                   [address] [place] [address]

 

                          Phone                     [phone]

 

                          Preferred Language        en

 

                          Marital Status            Unknown

 

                          Yazidi Affiliation     Unknown

 

                          Race                      Unknown

 

                          Ethnic Group              Unknown









Author







                          Organization              [employer]









Care Team Providers







                    Name                Role                Phone

 

                    Lalo Jorge A       Provider Role       Unavailable









Allergies

No Known Allergies



Problems







             Problem Type Condition    Code         Onset Dates  Condition Statu

s

 

             Problem      Tobacco use disorder F17.200                   Active

 

             Problem      Chronic obstructive pulmonary J44.9                   

  Active



                          disease, unspecified COPD type                        

   

 

             Assessment   Chronic obstructive pulmonary J44.9                   

  Active



                          disease, unspecified COPD type                        

   

 

             Problem      Osteoarthritis of right hand, M19.041                 

  Active



                          unspecified osteoarthritis type                       

    

 

             Problem      Arthritis of hand, right M19.041                   Act

brendan

 

             Problem      Primary osteoarthritis of right M17.11                

    Active



                          knee                                   

 

             Problem      Generalized anxiety disorder F41.1                    

 Active

 

             Problem      Chronic hepatitis C without hepatic B18.2             

        Active



                          coma                                   

 

             Problem      HTN, goal below 140/90 I10                       Activ

e

 

             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                  

   Active



                          knees                                  







Medications







        Medication Code    Code    Instructions Start   End Date Status  Dosage



                System                  Date                    

 

        ProAir HFA NDC     [id number] 108 (90 Base)                 Active  2 p

uffs as



                                MCG/ACT                         needed



                                Inhalation every                         



                                6 hrs                           

 

        Breo Ellipta NDC     [id number] 200-25 MCG/INH                 Active  

1 puff



                                Inhalation Once                         



                                a day                           







Results

No Known Results



Summary Purpose

[employer] Submission

## 2020-07-23 NOTE — XMS REPORT
[facility]

                            Created on:2020



Patient:Jefferson Rangel

Sex:Male

:1965

External Reference #:[id number]





Demographics







                          Address                   [address] [place] [address]

 

                          Phone                     [phone]

 

                          Preferred Language        en

 

                          Marital Status            Unknown

 

                          Moravian Affiliation     Unknown

 

                          Race                      Unknown

 

                          Ethnic Group              Unknown









Author







                          [facility]









Care Team Providers







                    Name                Role                Phone

 

                    RiddleJian       Provider Role       Unavailable









Allergies, Adverse Reactions, Alerts







                    Substance           Reaction            Event Type

 

                    N.K.D.A.            Info Not Available  Non Drug Allergy







Problems







             Problem Type Condition    Code         Onset Dates  Condition Statu

s

 

             Problem      Generalized anxiety disorder F41.1                    

 Active

 

             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                  

   Active



                          knees                                  

 

             Problem      Chronic obstructive pulmonary J44.9                   

  Active



                          disease, unspecified COPD type                        

   

 

             Problem      Loose body in knee, right knee M23.41                 

   Active

 

             Problem      Primary osteoarthritis of left knee M17.12            

        Active

 

             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             

        Active



                          knee                                   

 

             Problem      Arthritis of hand, right M19.041                   Act

brendan

 

             Problem      HTN, goal below 140/90 I10                       Activ

e

 

             Problem      Primary osteoarthritis of right M17.11                

    Active



                          knee                                   

 

             Problem      Osteoarthritis of right hand, M19.041                 

  Active



                          unspecified osteoarthritis type                       

    

 

             Assessment   Primary osteoarthritis of left knee M17.12            

        Active

 

             Assessment   Primary osteoarthritis of right M17.11                

    Active



                          knee                                   

 

             Assessment   Loose body in knee, right knee M23.41                 

   Active

 

             Assessment   Pain in joint of left knee M25.562                   A

ctive

 

             Assessment   Pain of joint of left ankle and M25.572               

    Active



                          foot                                   

 

             Problem      Tobacco use disorder F17.200                   Active

 

             Assessment   Pain in joint of right knee M25.561                   

Active

 

             Problem      Chronic hepatitis C without hepatic B18.2             

        Active



                          coma                                   







Medications







        Medication Code    Code    Instructions Start   End     Status  Dosage



                System                  Date    Date            

 

        Duexis  NDC     02689318777 800-26.6 MG                 Active  1 tablet



                                Orally Three                         



                                times a day                         



                                PRN PAIN                         

 

        ProAir HFA NDC     81512801579 108 (90 Base)                 Active  2 p

uffs as



                                MCG/ACT                         needed



                                Inhalation                         



                                every 6 hrs                         

 

        Fish Oil [place]-3 NDC     34392965481 1000 MG Orally                 Acti

ve  1 capsule



                                Once a day                         

 

        Chantix Starting NDC     68746067795 0.5 MG X 11 &                 Activ

e  as directed



        Month Peter                 1 MG X 42                         



                                Orally use as                         



                                directed                         

 

        Ginkoba NDC     57068652696 40 MG Orally                 Active  as dire

cted

 

        Zinc    NDC     69888962460 50 MG Orally                 Active  1 table

t



                                Once a day                         

 

        Multivitamin NDC     71923593705 - Orally                 Active  as dir

ected



        Adults 50+                                                 

 

        Fluocinolone NDC     69492441068 0.01 %                  Active  1 appli

cation



        Acetonide                 Externally                         to affected



                                Twice a day                         area

 

        Gabapentin NDC     65390237989 600 MG Orally                 Active  1 c

apsule



                                TID                             

 

        Tumersaid NDC     00274120937 - Orally                 Active  as direct

ed

 

        Tramadol HCl NDC     02410311455 50 MG PO Q 6 Oct 22,         Active  1 

tablet



                                hrs PRN Pain 2019                    

 

        Chantix NDC     28363661069 1 MG Orally                 Active  1 tablet



        Continuing Month                 Twice a day                         



        Peter                                                     

 

        Acetaminophen NDC     [address]-61 500 MG Orally                 Active

  1 capsule as



                                every 6 hrs                         needed

 

        Healthy NDC     40788366359 - Orally                 Active  as directed



        Eyes/Lutein                                                 

 

        Lidocaine HCl NDC     84822-66357 2.5 %                   Active  as dir

ected



                                Externally                         

 

        Calcipotriene NDC     18895031928 0.005 %                 Active  1 appl

ication



                                Externally                         to affected



                                Twice a day                         area

 

        Breo Ellipta NDC     90220199150 200-25 MCG/INH                 Active  

1 puff



                                Inhalation                         



                                Once a day                         







Results

No Known Results



Summary Purpose

[employer] Submission

## 2020-07-23 NOTE — XMS REPORT
[facility]

                             Created on:May 20, 2020



Patient:Jefferson Rangel

Sex:Male

:1965

External Reference #:[id number]





Demographics







                          Address                   [address] [place] [address]

 

                          Phone                     [phone]

 

                          Preferred Language        en

 

                          Marital Status            Unknown

 

                          Roman Catholic Affiliation     Unknown

 

                          Race                      Unknown

 

                          Ethnic Group              Unknown









Author







                          Organization              [facility]









Care Team Providers







                    Name                Role                Phone

 

                    Jorge A May       Provider Role       Unavailable









Allergies

No Known Allergies



Problems







             Problem Type Condition    Code         Onset Dates  Condition Statu

s

 

             Problem      Tobacco use disorder F17.200                   Active

 

             Problem      Chronic obstructive pulmonary J44.9                   

  Active



                          disease, unspecified COPD type                        

   

 

             Problem      Osteoarthritis of right hand, M19.041                 

  Active



                          unspecified osteoarthritis type                       

    

 

             Problem      Arthritis of hand, right M19.041                   Act

brendan

 

             Problem      Primary osteoarthritis of right M17.11                

    Active



                          knee                                   

 

             Problem      Generalized anxiety disorder F41.1                    

 Active

 

             Problem      Chronic hepatitis C without hepatic B18.2             

        Active



                          coma                                   

 

             Problem      HTN, goal below 140/90 I10                       Activ

e

 

             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                  

   Active



                          knees                                  







Medications

No Known Medications



Results

No Known Results



Summary Purpose

[employer] Submission

## 2021-05-11 ENCOUNTER — HOSPITAL ENCOUNTER (EMERGENCY)
Dept: HOSPITAL 97 - ER | Age: 56
Discharge: LEFT BEFORE DISCHARGE | End: 2021-05-11
Payer: COMMERCIAL

## 2021-05-11 VITALS — DIASTOLIC BLOOD PRESSURE: 82 MMHG | OXYGEN SATURATION: 97 % | TEMPERATURE: 98.4 F | SYSTOLIC BLOOD PRESSURE: 137 MMHG

## 2021-05-11 DIAGNOSIS — M25.551: Primary | ICD-10-CM

## 2021-05-11 DIAGNOSIS — F17.220: ICD-10-CM

## 2021-05-11 PROCEDURE — 99283 EMERGENCY DEPT VISIT LOW MDM: CPT

## 2021-05-11 PROCEDURE — 96372 THER/PROPH/DIAG INJ SC/IM: CPT

## 2021-05-11 NOTE — XMS REPORT
Continuity of Care Document

                             Created on:May 11, 2021



Patient:LUCINA OLIVAS

Sex:Male

:1965

External Reference #:[id number]





Demographics







                          Address                   [address]



                                                    [place] [address]

 

                          Home Phone                [phone]

 

                          Work Phone                [phone]

 

                          Mobile Phone              [phone]

 

                          Email Address             [email or web address]

 

                          Preferred Language        en

 

                          Marital Status            Unknown

 

                          Hoahaoism Affiliation     Unknown

 

                          Race                      Unknown

 

                          Additional Race(s)        Unavailable

 

                          Ethnic Group              Unknown









Author







                          Organization              [facility] [place] [facility]

t

 

                          Address                   [address]



                                                    [place] [address]

 

                          Phone                     [phone]









Support







                Name            Relationship    Address         Phone

 

                Hunters      Unavailable     [address] [phone]



                                                [place] [address] 

 

                Kendall          Unavailable     Unavailable     [phone]









Care Team Providers







                    Name                Role                Phone

 

                    Unavailable         Unavailable         Unavailable









Problems

This patient has no known problems.



Allergies, Adverse Reactions, Alerts

This patient has no known allergies or adverse reactions.



Medications







       Ordered Filled Start  Stop   Current Ordering Indication Dosage Frequency

 Signature

                    Comments            Components          Source



     Medication Medication Date Date Medication? Clinician                (SIG) 

          



     Name Name                                                   

 

     Wellbutrin Wellbutrin 0      Yes  Jian                1 tablet     

      [facility]



     XL   XL   8-10           [facility]                in the           [facility]



               00:00:                               morning           [facility]



               00                                                l



                                                                 Outpati



                                                                 ent



                                                                 Clinics

 

     Chantix Chantix           Yes  Jian                1 tablet           [facility]

[facility]



     Continuing Continuing                [facility]                               January

es -



     Month Peter Month Peter                                                   [facility]

[facility]

 

     [facility]           Yes  Jian                as             [facility]



                              [facility]                directed           [facility]



                                                                 [facility]



                                                                 l



                                                                 Outpati



                                                                 ent



                                                                 Clinics

 

     Healthy Healthy           Yes  Jian                as             [facility]



     Eyes/Lutein Eyes/Lutein                [facility]                directed        

   [facility]



                                                                 [facility]



                                                                 l



                                                                 Outpati



                                                                 ent



                                                                 Clinics

 

     Triamcinolo Triamcinolo           Yes  Jian                not          

  [facility]



     ne   ne                  [facility]                defined           [facility]



     Acetonide Acetonide                                                   [facility]

ia



                                                                 l



                                                                 Outpati



                                                                 ent



                                                                 Clinics

 

     Turmeric Turmeric           Yes  Jian                not            [facility]



                              [facility]                defined           [facility]



                                                                 [facility]



                                                                 l



                                                                 Outpati



                                                                 ent



                                                                 Clinics

 

     Tumersaid Tumersaid           Yes  Jian                as             [facility]

[facility]



                              [facility]                directed           [facility]



                                                                 [facility]



                                                                 l



                                                                 Outpati



                                                                 ent



                                                                 Clinics

 

     Chantix Chantix           Yes  Jian                as             [facility]



     Starting Starting                Riddle                directed           January

es -



     Month Peter Month Peter                                                   [facility]

ia



                                                                 l



                                                                 Outpati



                                                                 ent



                                                                 Clinics

 

     Albuterol Albuterol           Yes  Jian                not            [facility]

[facility]



     Sulfate HFA Sulfate HFA                [facility]                defined         

  [facility]



                                                                 [facility]



                                                                 l



                                                                 Outpati



                                                                 ent



                                                                 Clinics

 

     Multivitami Multivitami           Yes  Jian                as           

  [facility]



     n Adults n Adults                [facility]iddle                directed           January

es -



     50+  50+                                                    [facility]



                                                                 l



                                                                 Outpati



                                                                 ent



                                                                 Clinics

 

     Clindamycin Clindamycin           Yes  Jian                not          

  [facility]



     HCl  HCl                 [facility]                defined           [facility]



                                                                 [facility]



                                                                 l



                                                                 Outpati



                                                                 ent



                                                                 Clinics

 

     Fluocinolon Fluocinolon           Yes  Jian                1            

  [facility]



     e Acetonide e Acetonide                [facility]                applicatio      

     [facility]



                                                  n to           [facility]



                                                  affected           l



                                                  area           Outpati



                                                                 ent



                                                                 Clinics

 

     Acetaminoph Acetaminoph           Yes  Jian                1 capsule    

       [facility]



     en   en                  [facility]                as needed           [facility]



                                                                 [facility]



                                                                 [facility]



                                                                 Outpati



                                                                 ent



                                                                 Clinics

 

     Breo Breo           Yes  Jian                1 puff           [facility]



     Ellipta Ellipta                [facility]                               [facility]



                                                                 [facility]



                                                                 l



                                                                 Outpati



                                                                 ent



                                                                 Clinics

 

     Chlorhexidi Chlorhexidi           Yes  Jian                not          

  [facility]



     ne   ne                  [facility]                defined           [facility]



     Gluconate Gluconate                                                   [facility]

ia



                                                                 l



                                                                 Outpati



                                                                 ent



                                                                 Clinics

 

     Sulfamethox Sulfamethox           Yes  Jian                not          

  [facility]



     azole-Trime azole-Trime                [facility]                defined         

  [facility]



     thoprim thoprim                                                   [facility]



                                                                 l



                                                                 Outpati



                                                                 ent



                                                                 Clinics

 

     Fish Oil Fish Oil           Yes  Jian                1 capsule          

 [facility]



     Omega-3 Omega-3                [facility]                               [facility]



                                                                 [facility]



                                                                 l



                                                                 Outpati



                                                                 ent



                                                                 Clinics

 

     Hydrocodone Hydrocodone           Yes  Jian                not          

  [facility]



     -Acetaminop -Acetaminop                [facility]                defined         

  [facility]



     hen  hen                                                    [facility]



                                                                 l



                                                                 Outpati



                                                                 ent



                                                                 Clinics

 

     Amoxicillin Amoxicillin           Yes  Jian                not          

  [facility]



                              [facility]                defined           [facility]



                                                                 [facility]



                                                                 l



                                                                 Outpati



                                                                 ent



                                                                 Clinics

 

     Gabapentin Gabapentin           Yes  Jian                1 capsule      

     [facility]



                              [facility]                               [facility]



                                                                 [facility]



                                                                 l



                                                                 Outpati



                                                                 ent



                                                                 Clinics

 

     Lidocaine Lidocaine           Yes  Jian                as             [facility]

[facility]



     HCl  HCl                 [facility]                directed           [facility]



                                                                 [facility]



                                                                 l



                                                                 Outpati



                                                                 ent



                                                                 Clinics

 

     Acetaminoph Acetaminoph           Yes  Jian                not          

  [facility]



     en-Codeine en-Codeine                [facility]                defined           

[facility]



     #3   #3                                                     [facility]



                                                                 l



                                                                 Outpati



                                                                 ent



                                                                 Clinics

 

     Duexis Duexis           Yes  Jian                1 tablet           [facility]



                              [facility]                               [facility]



                                                                 [facility]



                                                                 [facility]



                                                                 Outpati



                                                                 ent



                                                                 Clinics

 

     Zinc Zinc           Yes  Jian                1 tablet           [facility]



                              [facility]                               [facility]



                                                                 [facility]



                                                                 l



                                                                 Outpati



                                                                 ent



                                                                 Clinics

 

     Calcipotrie Calcipotrie           Yes  Jian                1            

  [facility]



     ne   ne                  [facility]                applicatio           [facility]



                                                  n to           [facility]



                                                  affected           l



                                                  area           Outpati



                                                                 ent



                                                                 Clinics

 

     ProAir HFA ProAir HFA           Yes  Jian                2 puffs as     

      [facility]



                              [facility]                needed           [facility]



                                                                 [facility]



                                                                 l



                                                                 Outpati



                                                                 ent



                                                                 Clinics

 

     Chantix Chantix           Yes  Jian                not            [facility]



                              [facility]                defined           [facility]



                                                                 [facility]



                                                                 [facility]



                                                                 Outpati



                                                                 ent



                                                                 Clinics

 

     BuPROPion BuPROPion           Yes  Jian                not            [facility]

[facility]



     HCl ER (XL) HCl ER (XL)                [facility]                defined         

  [facility]



                                                                 [facility]



                                                                 l



                                                                 Outpati



                                                                 ent



                                                                 Clinics







Procedures

This patient has no known procedures.



Encounters







        Start   End     Encounter Admission Attending Care    Care    Encounter 

Source



        Date/Time Date/Time Type    Type    Clinicians Facility Department ID   

   

 

        2021 Outpatient                 [facility]  [facility]  [id number]

 [facility]



        00[facility] 00[facility]00                                                 [facility]



                                                                        [facility]



                                                                        [facility]pati



                                                                        ent



                                                                        Clinics

 

        2021 Outpatient                 [facility]  [facility]  [id number]

 [facility]



        00[facility]00                                                 [facility]



                                                                        [facility]



                                                                        Outpati



                                                                        ent



                                                                        Clinics

 

        2021 Outpatient                 [facility]  [facility]  [id number]

 [facility]



        00[facility]00[facility] 00[facility]00[facility]00                                                 [facility]



                                                                        [facility]



                                                                        Outpati



                                                                        ent



                                                                        Clinics

 

        2021 Outpatient                 [facility]  ST[facility]  [id number]

 [facility]



        00:00[facility]00 00:00:00                                                 [facility]



                                                                        [facility]



                                                                        Outpati



                                                                        ent



                                                                        Clinics

 

        2021 Outpatient                 [facility]  [facility]  [id number]

 [facility]



        00:00[facility]00 00[facility]00[facility]00                                                 [facility]



                                                                        [facility]



                                                                        Outpati



                                                                        ent



                                                                        Clinics

 

        2021 Outpatient                 [facility]  [facility]  [id number]

 [facility]



        00:00[facility]00 00[facility]00[facility]00                                                 [facility]



                                                                        l



                                                                        Outpati



                                                                        ent



                                                                        Clinics

 

        2021 Outpatient                 [facility]  [facility]LC  [id number]

 [facility]



        00:00[facility]00 00[facility]00:00                                                 [facility]



                                                                        [facility]



                                                                        l



                                                                        Outpati



                                                                        ent



                                                                        Clinics

 

        2020 Outpatient                 [facility]  [facility]  [id number]

 [facility]



        00:00[facility]00 00[facility]00[facility]00                                                 [facility]



                                                                        l



                                                                        Outpati



                                                                        ent



                                                                        Clinics

 

        2020 Outpatient                 [facility]  [facility]  [id number]

 [facility]



        00:00:00 00:00:00                                                 [facility]



                                                                        l



                                                                        Outpati



                                                                        ent



                                                                        Clinics

 

        2020 Outpatient                 [facility]  [facility]  [id number]

 [facility]



        00[facility]00[facility]00 00[facility]00:00                                                 [facility]



                                                                        [facility]



                                                                        l



                                                                        Outpati



                                                                        ent



                                                                        Clinics

 

        2020-09-15 2020-09-15 Outpatient                 [facility]

[id number] [facility]



        14:29:00 14:29:00                         t [employer]

s [employer]   [facility]                 Outpati



                                                                        ent



                                                                        Clinics

 

        2020 Outpatient                 [facility]

[id number] [facility]



        16:26:00 16:26:00                         t [employer]   [email or web address]

s [employer]   [facility]                 Outpati



                                                                        ent



                                                                        Clinics

 

        2020 Outpatient                 [facility]

[id number] [facility]



        11:57:00 11:57:00                         t Tal[employer]   [facility]                 Outpati



                                                                        ent



                                                                        Clinics

 

        2020 Outpatient                 [facility]

[id number] [facility]



        10:20:00 10:20:00                         t [employer]   [facility]                 Outpati



                                                                        ent



                                                                        Clinics

 

        2020 Outpatient                 [facility]

[id number] [facility]



        08:45:00 08:45:00                         t [facility]



                                                [facility]

a



                                                [facility]         ent



                                                                        Clinics

 

        2020 Outpatient                 [facility]

[id number] [facility]



        15:01:00 15:01:00                         t Talicious   [facility]



                                                                        ent



                                                                        Clinics

 

        2020 Outpatient                 [facility]

[id number] [facility]



        08:45:00 08:45:00                         t [facility]         [facility]

 -



                                                [facility]           [facility]

a



                                                [facility]         ent



                                                                        Clinics

 

        2020 Outpatient                 [facility]

[id number] [facility]



        08:30:00 08:30:00                         t Talicious   [facility]                 Outpati



                                                                        ent



                                                                        Clinics

 

        2020-08-10 2020-08-10 Outpatient                 [facility] [id number]

[id number] [facility]



        13:06:00 13:06:00                         [facility] 

-



                                                [facility]



                                                [facility]           l



                                                [facility]

 

        2020-08-10 2020-08-10 Outpatient                 [facility] 31

[id number] [facility]



        10:15:00 10:15:00                         [facility] [employer] [facility]



                                                [facility]

 

        2020-08-10 2020-08-10 Outpatient                 [facility]

[id number] [facility]



        08:30:00 08:30:00                         t [facility]         [facility]

 -



                                                [facility]           [facility]

a



                                                Clinic of Clinic of         [facility]

 

        2020 Outpatient                 [facility]

[id number] [facility]



        11:21:00 11:21:00                         t Bone  [facility]         [facility]

 -



                                                [facility]           [facility]

a



                                                Clinic of Clinic of         [facility]

 

        2020 Outpatient                 [facility]

[id number] [facility]



        08:18:00 08:18:00                         t [facility]

 

        2020 Outpatient                 [facility]

[id number] [facility]



        20:25:00 20:25:00                         t [facility]         [facility]

 -



                                                [facility]           [facility]

a



                                                Clinic of [facility]         [facility]

 

        2020 Outpatient                 [facility]

[id number] [facility]



        10:49:00 10:49:00                         t [facility]

s [facility]

 

        2020-07-15 2020-07-15 Outpatient                 [facility]

[id number] [facility]



        12:00:00 12:00:00                         t [facility]         [facility]

 -



                                                [facility]           [facility]

a



                                                Clinic of Clinic of         [facility]

 

        2020-07-15 2020-07-15 Outpatient                 [facility]

[id number] [facility]



        10:16:00 10:16:00                         t [facility]         [facility]

 -



                                                [facility]           [facility]

a



                                                Clinic of Clinic of         [facility]

 

        2020 Outpatient                 [facility]

[id number] [facility]



        08:00:00 08:00:00                         t Bone  [facility]         [facility]

 -



                                                [facility]           [facility]

a



                                                Cl[facility] of [facility]         [facility]

 

        2020 Outpatient                 [facility]

[id number] [facility]



        09:53:00 09:53:00                         t [employer]   [employer] 



                                                [employer]   [facility]



                                                                        ent



                                                                        Clinics

 

        2020 Outpatient                 [facility]

[id number] [facility]



        10:28:00 10:28:00                         t [employer]         [employer] [facility]



                                                                        ent



                                                                        [facility]

 

        2020 Outpatient                 [facility]

[id number] [facility]



        08:30:00 08:30:00                         t Oak   [employer]         [employer] [facility]



                                                                        ent



                                                                        [facility]

 

        2020 Outpatient                 [facility]

[id number] [facility]



        14:27:00 14:27:00                         t Bone  [facility]         [facility]

 -



                                                [facility]           [facility]

a



                                                [facility]         ent



                                                                        Clinics

 

        2020 Outpatient                 [facility]

[id number] [facility]



        10:00:00 10:00:00                         t Bone  [facility]         [facility]

 -



                                                [facility]           [facility]

a



                                                [facility]inic of [facility]         ent



                                                                        Clinics

 

        2020 Outpatient                 [facility] [address]

[id number] [facility]



        10:15:00 10:15:00                         t [employer]         [employer] [facility]



                                                                        ent



                                                                        Clinics

 

        2019 Outpatient                 [facility]

[id number] [facility]



        11:38:00 11:38:00                         t Oak   [employer]         [employer] [facility]



                                                                        ent



                                                                        Clinics

 

        2019 Outpatient                 [facility] [address]

[id number] [facility]



        10:38:00 10:38:00                         t [employer]         [employer] [facility]



                                                                        ent



                                                                        Clinics

 

        2019 Outpatient                 [facility] [address]

[id number] [facility]



        08:30:00 08:30:00                         t Bone  [facility]         [facility]

 -



                                                [facility]           [facility]

a



                                                [facility]         ent



                                                                        Clinics

 

        2019 Outpatient                 [facility] [address]

[id number] [facility]



        14:20:00 14:20:00                         t Oak   [address]         [facility]

s -



                                                Drive   [facility]



                                                                        ent



                                                                        Clinics

 

        2019 Outpatient                 [facility]

[id number] [facility]



        10:00:00 10:00:00                         t Oak   [address]         Luke

s -



                                                Drive   [facility]



                                                                        ent



                                                                        Clinics

 

        2019 Outpatient                 [facility] [address]

[id number] [facility]



        09:45:00 09:45:00                         t Oak   [address]         Luke

s -



                                                Drive   [facility]



                                                                        ent



                                                                        Clinics

 

        2019 Outpatient                 [facility] [address]

[id number] [facility]



        08:30:00 08:30:00                         t Bone  B[facility]         [facility]

 -



                                                [facility]           [facility]

a



                                                Clinic of [facility]         ent



                                                                        Clinics

 

        2019-10-22 2019-10-22 Outpatient                 [facility]

[id number] [facility]



        09:30:00 09:30:00                         t Bone  [facility]         [facility]

 -



                                                [facility]           [facility]

a



                                                Clinic of [facility]         ent



                                                                        Clinics

 

        2019-10-16 2019-10-16 Outpatient                 [facility]

[id number] [facility]



        11:00:00 11:00:00                         t Bone  B[facility]         [facility]

 -



                                                and [facility]           [facility]

a



                                                Clinic of [facility]         ent



                                                                        Clinics

 

        2019-10-08 2019-10-08 Outpatient                 [facility]

[id number] [facility]



        13:52:00 13:52:00                         t Oak   [address]         Luke

s -



                                                Drive   [facility]



                                                                        ent



                                                                        Clinics

 

        2019-10-07 2019-10-07 Outpatient                 [facility]

[id number] [facility]



        10:30:00 10:30:00                         t Oak   [address]         Luke

s -



                                                Drive   [facility]



                                                                        ent



                                                                        Clinics

 

        2019-10-03 2019-10-03 Outpatient                 [facility]

[id number] [facility]



        18:36:00 18:36:00                         t Oak   [address]         Luke

s -



                                                Drive   [facility]



                                                                        ent



                                                                        Clinics

 

        2019-10-03 2019-10-03 Outpatient                 [facility]

[id number] [facility]



        09:48:00 09:48:00                         t Oak   [address]         Luke

s [facility]



                                                                        ent



                                                                        [facility]

 

        2019-10-02 2019-10-02 Outpatient                 Stephanie Singh 27

[id number] [facility]



        08:00:00 08:00:00                         t Bone  B[facility]         [facility]

 -



                                                [facility]           [facility]

a



                                                Clinic of [facility]

 

        2019 Outpatient                 Stephanie Singh 27

[id number] [facility]



        08:00:00 08:00:00                         t Bone  [facility]         [facility]

 -



                                                and Joint Joint           [facility]

a



                                                Clinic of [facility]         ent



                                                                        [facility]







Results

This patient has no known results.

## 2021-05-11 NOTE — ER
Nurse's Notes                                                                                     

 [facility]                                                                 

Name: Jefferson Rangel Jr                                                                        

Age: 56 yrs                                                                                       

Sex: Male                                                                                         

: 1965                                                                                   

MRN: [id number]                                                                                   

Arrival Date: 2021                                                                          

Time: 20:30                                                                                       

Account#: [id number]                                                                            

Bed 30                                                                                            

Private MD:                                                                                       

Diagnosis:                                                                                        

                                                                                                  

Presentation:                                                                                     

                                                                                             

20:42 Chief complaint: Patient states: he was walking to the door of his house when he heard  bb  

      a snap and he thinks he broke his right ankle. Coronavirus screen: At this time, the        

      client does not indicate any symptoms associated with coronavirus-19. Ebola Screen: No      

      symptoms or risks identified at this time. Initial Sepsis Screen: Does the patient meet     

      any 2 criteria? No. Patient's initial sepsis screen is negative. Does the patient have      

      a suspected source of infection? No. Patient's initial sepsis screen is negative. Risk      

      Assessment: Do you want to hurt yourself or someone else? Patient reports no desire to      

      harm self or others. Onset of symptoms was May 11, 2021.                                    

20:42 Method Of Arrival: Wheelchair                                                           bb  

20:42 Acuity: CASEY 4                                                                           bb  

                                                                                                  

Triage Assessment:                                                                                

20:42 General: Appears in no apparent distress. uncomfortable, Behavior is anxious. Pain:     bb  

      Complains of pain in right ankle Pain currently is 7 out of 10 on a pain scale. Neuro:      

      Level of Consciousness is awake, alert, obeys commands, Oriented to person, place,          

      time, situation. Cardiovascular: Capillary refill < 3 seconds Patient's skin is warm        

      and dry. Respiratory: Respiratory effort is even, unlabored, Respiratory pattern is         

      regular. GI: Reports nausea. Musculoskeletal: Circulation, motion, and sensation            

      intact. Reports pain in right ankle.                                                        

                                                                                                  

Historical:                                                                                       

- Allergies:                                                                                      

20:42 No Known Allergies;                                                                     bb  

                                                                                                  

- Immunization history:: Adult Immunizations up to date.                                          

- Social history:: Smoking status: Patient reports use of chewing tobacco.                        

                                                                                                  

                                                                                                  

Screenin:33 Abuse screen: Denies threats or abuse. Denies injuries from another. Nutritional        zb  

      screening: No deficits noted. Tuberculosis screening: No symptoms or risk factors           

      identified. Fall Risk None identified.                                                      

                                                                                                  

Assessment:                                                                                       

21:23 General: Appears unkempt, Behavior is agitated. Pain: Complains of pain in right ankle  zb  

      Pain currently is 9 out of 10 on a pain scale. Alleviated by Aggravated by increased        

      activity, repositioning, weight bearing. Neuro: Level of Consciousness is awake, alert,     

      obeys commands, Oriented to person, place, time, situation. Cardiovascular: Patient's       

      skin is warm and dry. Respiratory: Airway is patent Respiratory effort is even,             

      unlabored, Respiratory pattern is regular, symmetrical. GI: Abdomen is flat. Derm: Skin     

      is intact, is healthy with good turgor. Musculoskeletal: Circulation, motion, and           

      sensation intact. Range of motion: limited in right ankle.                                  

21:34 Reassessment: patient refused PO pain medication. states he would like morphine or      zb  

      diladid , medication ordered.                                                               

21:57 Reassessment: patient started yelling. states that he wants to leave. left without      zb  

      sighing the AMA form. Wheeled out by ER NURSE. Charge nurse aware.                          

                                                                                                  

Vital Signs:                                                                                      

20:42  / 82; Pulse 89; Resp 18 S; Temp 98.4(O); Pulse Ox 97% on R/A; Weight 68.04 kg    bb  

      (R); Height 5 ft. 8 in. (172.72 cm) (R); Pain 7/10;                                         

21:33 Pulse 86; Resp 16; Pulse Ox 97% on R/A;                                                 zb  

20:42 Body Mass Index 22.81 (68.04 kg, 172.72 cm)                                             bb  

                                                                                                  

ED Course:                                                                                        

20:30 Patient arrived in ED.                                                                  es  

20:42 Arm band placed on Patient placed in waiting room, Patient notified of wait time. X-ray bb  

      ordered.                                                                                    

20:44 Triage completed.                                                                       bb  

20:54 Sven Ceballos PA is PHCP.                                                              jmm 

20:54 Sebastien Lopez MD is Attending Physician.                                            jmm 

20:57 Shabnam Putnam, KAVYA is Primary Nurse.                                                   zb  

21:15 XRAY Ankle RIGHT 3 view In Process Unspecified.                                         EDMS

21:33 Patient has correct armband on for positive identification. Pulse ox on. NIBP on. Door  zb  

      closed. Noise minimized.                                                                    

21:58 No provider procedures requiring assistance completed. Inserted.                        zb  

21:58 Patient did not have IV access during this emergency room visit.                        zb  

                                                                                                  

Administered Medications:                                                                         

21:40 Drug: TORadol (ketorolac) 30 mg Route: IM; Site: right deltoid;                         zb  

21:50 Follow up: Response: No adverse reaction                                                zb  

21:43 Not Given (Patient Refused): Norco (HYDROcodone-acetaminophen) 10 mg-325 mg 1 tabs PO   zb  

      once; RASS on ADMIN: Combtv4, Very Agttd3, Agttd2, Rstlss1, AlertClm0, Drwsy-1, Lt          

      Sdtn-2, Mod Sdtn-3, Dp Sdtn-4, UnArsble-5                                                   

                                                                                                  

                                                                                                  

Outcome:                                                                                          

:59 Discharged to home via wheelchair.                                                      zb  

:59 Condition: stable                                                                           

21:59 Patient left the ED.                                                                    zb  

                                                                                                  

Signatures:                                                                                       

Dispatcher [employer]                           Sven Hussein PA PA jmm Salyer, Edna es Ballard, Brenda, RN                     RN   Shabnam Freed RN                     RN   zb                                                   

                                                                                                  

Corrections: (The following items were deleted from the chart)                                    

21:42 21:34 Reassessment: patient refused PO pain medication. states he would like morphine   zb  

      or diladid zb                                                                               

21:59 21:58 No provider procedures requiring assistance completed. zb                         zb  

                                                                                                  

**************************************************************************************************

## 2021-05-12 NOTE — RAD REPORT
EXAM DESCRIPTION:  RAD - Ankle Right 3 View - 5/11/2021 9:16 pm

 

CLINICAL HISTORY:  PAIN

 

COMPARISON:  No comparisons

 

FINDINGS:  No acute fracture or dislocation is seen. Small to moderate plantar calcaneal spur and min
imal posterior calcaneal spur.

## 2021-05-12 NOTE — EDPHYS
Physician Documentation                                                                           

 [facility]                                                                 

Name: Jefferson Rangel Jr                                                                        

Age: 56 yrs                                                                                       

Sex: Male                                                                                         

: 1965                                                                                   

MRN: [id number]                                                                                   

Arrival Date: 2021                                                                          

Time: 20:30                                                                                       

Account#: [id number]                                                                            

Bed 30                                                                                            

Private MD:                                                                                       

ED Physician Sebastien Lopez                                                                     

HPI:                                                                                              

                                                                                             

20:47 This 56 yrs old  Male presents to ER via Wheelchair with complaints of Ankle   jmm 

      Injury.                                                                                     

20:47 The patient presents with an injury, pain. Onset: The symptoms/episode began/occurred   jmm 

      acutely.                                                                                    

20:47 Associated signs and symptoms: Pertinent positives: pain.                               jmm 

20:47 Modifying factors: The symptoms are alleviated by nothing, the symptoms are aggravated  jmm 

      by weight bearing. This is a 56 year old male that complains of right lateral hip pain      

      beginning after walking down his driveway. Patient states he felt a pop. .                  

                                                                                                  

Historical:                                                                                       

- Allergies:                                                                                      

20:42 No Known Allergies;                                                                     bb  

                                                                                                  

- Immunization history:: Adult Immunizations up to date.                                          

- Social history:: Smoking status: Patient reports use of chewing tobacco.                        

                                                                                                  

                                                                                                  

ROS:                                                                                              

20:47 Constitutional: Negative for fever, chills, and weight loss, Cardiovascular: Negative   jmm 

      for chest pain, palpitations, and edema, Respiratory: Negative for shortness of breath,     

      cough, wheezing, and pleuritic chest pain.                                                  

20:47 MS/extremity: Positive for injury or acute deformity, pain.                                 

20:47 All other systems are negative.                                                             

                                                                                                  

Exam:                                                                                             

20:47 Constitutional:  This is a well developed, well nourished patient who is awake, alert,  jmm 

      and in no acute distress. Head/Face:  atraumatic. Eyes:  EOMI, no conjunctival erythema     

      appreciated ENT:  Moist Mucus Membranes Neck:  Trachea midline, Supple Chest/axilla:        

      Normal chest wall appearance and motion.   Cardiovascular:  Regular rate and rhythm.        

      No edema appreciated Respiratory:  Normal respirations, no respiratory distress             

      appreciated Abdomen/GI:  Non distended, soft Back:  Normal ROM Skin:  General               

      appearance color normal                                                                     

20:47 Musculoskeletal/extremity: lateral malleolus ttp, compartments are soft, NVI.               

20:47 Skin: Appearance: Color: normal in color.                                                   

20:47 Neuro: Orientation: is normal, Mentation: is normal, Memory: is normal.                     

20:47 Psych: Behavior/mood is pleasant, cooperative.                                              

                                                                                                  

Vital Signs:                                                                                      

20:42  / 82; Pulse 89; Resp 18 S; Temp 98.4(O); Pulse Ox 97% on R/A; Weight 68.04 kg    bb  

      (R); Height 5 ft. 8 in. (172.72 cm) (R); Pain 7/10;                                         

21:33 Pulse 86; Resp 16; Pulse Ox 97% on R/A;                                                 zb  

20:42 Body Mass Index 22.81 (68.04 kg, 172.72 cm)                                             bb  

                                                                                                  

MDM:                                                                                              

21:10 Patient medically screened.                                                             [facility] 

23:28 ED course: Patient exhibited drug seeking behavior. Requested diludid.                  [facility] 

                                                                                                  

                                                                                             

20:46 Order name: XRAY Ankle RIGHT 3 view                                                     bb  

                                                                                                  

Administered Medications:                                                                         

21:40 Drug: TORadol (ketorolac) 30 mg Route: IM; Site: right deltoid;                         zb  

21:50 Follow up: Response: No adverse reaction                                                zb  

21:43 Not Given (Patient Refused): Norco (HYDROcodone-acetaminophen) 10 mg-325 mg 1 tabs PO   zb  

      once; RASS on ADMIN: Combtv4, Very Agttd3, Agttd2, Rstlss1, AlertClm0, Drwsy-1, Lt          

      Sdtn-2, Mod Sdtn-3, Dp Sdtn-4, UnArsble-5                                                   

                                                                                                  

                                                                                                  

Disposition:                                                                                      

21 21:59 Patient left the facility before being seen by provider.                           

- Patient left due to (see nurse's notes).                                                        

                                                                                                  

                                                                                                  

                                                                                                  

                                                                                                  

Addendum:                                                                                         

2021                                                                                        

     05:08 Co-signature as Attending Physician, Sebastien Lopez MD I agree with the assessment and t
w4

           plan of care.                                                                          

                                                                                                  

Signatures:                                                                                       

Dispatcher [employer]                           EDMS                                                 

Sven Ceballos PA PA jmm Ballard, Brenda, RN                     RN   Sebastien Samson MD MD   tw4                                                  

Shabnam Putnam RN                     RN   zb                                                   

                                                                                                  

**************************************************************************************************

## 2021-06-15 ENCOUNTER — HOSPITAL ENCOUNTER (EMERGENCY)
Dept: HOSPITAL 97 - ER | Age: 56
Discharge: HOME | End: 2021-06-15
Payer: COMMERCIAL

## 2021-06-15 VITALS — TEMPERATURE: 97.9 F | SYSTOLIC BLOOD PRESSURE: 149 MMHG | OXYGEN SATURATION: 99 % | DIASTOLIC BLOOD PRESSURE: 94 MMHG

## 2021-06-15 DIAGNOSIS — M79.644: Primary | ICD-10-CM

## 2021-06-15 PROCEDURE — 73130 X-RAY EXAM OF HAND: CPT

## 2021-06-15 PROCEDURE — 99283 EMERGENCY DEPT VISIT LOW MDM: CPT

## 2021-06-15 NOTE — RAD REPORT
EXAM DESCRIPTION:  RAD - Hand Right 3 View - 6/15/2021 7:52 am

 

CLINICAL HISTORY:  PAIN, history of frostbite

 

COMPARISON:  Hand Right 3 View dated 12/20/2019; Hand Right 3 View dated 12/4/2019

 

FINDINGS:  No fracture is identified. There is no dislocation or periosteal reaction noted.  Remodeli
ng changes are present involving the tuft of the first and second digits presumably no remote frostbi
te injury.

 

Postsurgical changes are present at the third MCP joint. Interposition material has been placed. Bone
 resorption changes are present at the distal third metacarpal and third proximal phalanx abutting th
e interposition material.

 

Remodeling changes are present from prior injury to the distal fifth metacarpal. There is apparently 
a fixed flexure at the fifth PIP joint.

 

IMPRESSION:  No acute findings to the right hand.

 

Patient has remodeling and resumption changes to the third metacarpal and third proximal phalanx wher
e they abut the interposition material placed at the third MCP joint. This is a progression from the 
2019 comparison.

## 2021-06-15 NOTE — ER
Nurse's Notes                                                                                     

 [facility]                                                                 

Name: Jefferson Rangel Jr                                                                        

Age: 56 yrs                                                                                       

Sex: Male                                                                                         

: 1965                                                                                   

MRN: [id number]                                                                                   

Arrival Date: 06/15/2021                                                                          

Time: 06:33                                                                                       

Account#: [id number]                                                                            

Bed 7                                                                                             

Private MD:                                                                                       

Diagnosis: Pain in right finger(s)-index and thumb                                                

                                                                                                  

Presentation:                                                                                     

06/15                                                                                             

07:00 Chief complaint: Patient states: he has frostbite to both hands years ago and has a lot bb  

      of pain to his hands his right thumb and forefinger are extremely painful and all the       

      tips of his fingers on his left hand feel extremely cold. Coronavirus screen: At this       

      time, the client does not indicate any symptoms associated with coronavirus-19. Ebola       

      Screen: No symptoms or risks identified at this time. Initial Sepsis Screen: Does the       

      patient meet any 2 criteria? No. Patient's initial sepsis screen is negative. Does the      

      patient have a suspected source of infection? No. Patient's initial sepsis screen is        

      negative. Risk Assessment: Do you want to hurt yourself or someone else? Patient            

      reports no desire to harm self or others. Onset of symptoms is unknown.                     

07:00 Method Of Arrival: Ambulatory                                                           bb  

07:00 Acuity: CASEY 3                                                                           bb  

                                                                                                  

Historical:                                                                                       

- Allergies:                                                                                      

07:06 No Known Allergies;                                                                     bb  

- Home Meds:                                                                                      

07:06 None [Active];                                                                          bb  

- PMHx:                                                                                           

07:06 Arthritis; COPD; Hepatitis C; neuropathy;                                               bb  

                                                                                                  

- Immunization history:: Adult Immunizations up to date.                                          

- Social history:: Smoking status: Patient/guardian denies using tobacco, the patient             

  reports quitting approximately 2 years ago, Patient uses alcohol, occasionally.                 

  Patient/guardian denies using street drugs.                                                     

- Family history:: not pertinent.                                                                 

                                                                                                  

                                                                                                  

Screenin:15 Abuse screen: Denies threats or abuse. Denies injuries from another. Nutritional        hb  

      screening: No deficits noted. Tuberculosis screening: No symptoms or risk factors           

      identified. Fall Risk None identified.                                                      

                                                                                                  

Assessment:                                                                                       

07:45 General: Appears in no apparent distress. Behavior is calm, cooperative, appropriate    tw2 

      for age. Pain: Complains of pain in right hand and left hand. Neuro: Level of               

      Consciousness is awake, alert, obeys commands, Oriented to person, place, time,             

      situation. Respiratory: Airway is patent Respiratory effort is even, unlabored,             

      Respiratory pattern is regular, symmetrical. GI: No signs and/or symptoms were reported     

      involving the gastrointestinal system. Musculoskeletal: Range of motion: intact in all      

      extremities.                                                                                

                                                                                                  

Vital Signs:                                                                                      

07:00  / 94; Pulse 76; Resp 18 S; Temp 97.9(O); Pulse Ox 99% on R/A; Weight 65.77 kg    bb  

      (R); Height 5 ft. 9 in. (175.26 cm) (R); Pain 8/10;                                         

07:00 Body Mass Index 21.41 (65.77 kg, 175.26 cm)                                               

                                                                                                  

ED Course:                                                                                        

06:33 Patient arrived in ED.                                                                  am4 

07:05 Triage completed.                                                                         

07:06 Arm band placed on Patient placed in an exam room, on a stretcher, on pulse oximetry.     

07:15 Binh Porter MD is Attending Physician.                                             [facility] 

07:15 Patient has correct armband on for positive identification. Bed in low position. Call     

      light in reach.                                                                             

07:44 Zavala, Anel, RN is Primary Nurse.                                                        tw2 

07:53 Hand Right 3 View XRAY In Process Unspecified.                                          [facility]

08:03 Alli Estrada MD is Referral Physician.                                               [facility] 

08:15 No provider procedures requiring assistance completed. Patient did not have IV access   ld1 

      during this emergency room visit.                                                           

                                                                                                  

Administered Medications:                                                                         

07:44 Drug: Bactroban (mupirocin) Ointment 2 % 1 application Route: Topical; Site: affected   ld1 

      area;                                                                                       

08:14 Follow up: Response: No adverse reaction                                                ld1 

07:44 Drug: Norco (HYDROcodone-acetaminophen) 5 mg-325 mg 1 tabs Route: PO;                   ld1 

08:14 Follow up: Response: No adverse reaction                                                ld1 

08:09 Drug: Aspirin 162 mg Route: PO;                                                         ld1 

08:14 Follow up: Response: No adverse reaction                                                ld1 

                                                                                                  

                                                                                                  

Outcome:                                                                                          

08:04 Discharge ordered by MD.                                                                [facility] 

08:15 Discharged to home ambulatory.                                                          ld1 

08:15 Condition: stable                                                                           

08:15 Discharge instructions given to patient, Instructed on discharge instructions, follow       

      up and referral plans. medication usage, Demonstrated understanding of instructions,        

      follow-up care, medications.                                                                

08:15 Patient left the ED.                                                                    ld1 

                                                                                                  

Signatures:                                                                                       

Dispatcher [employer]                           EDMS                                                 

Binh Porter MD MD cha Ballard, Brenda, RN RN                                                      

Dayan Arambula RN RN                                                      

Anel Zavala RN RN   2                                                  

Marley Welch                             am4                                                  

Kim Bolanos, RN                     RN   ld1                                                  

                                                                                                  

**************************************************************************************************

## 2021-06-15 NOTE — EDPHYS
Physician Documentation                                                                           

 [facility]                                                                 

Name: Jefferson Rangel Jr                                                                        

Age: 56 yrs                                                                                       

Sex: Male                                                                                         

: 1965                                                                                   

MRN: [id number]                                                                                   

Arrival Date: 06/15/2021                                                                          

Time: 06:33                                                                                       

Account#: [id number]                                                                            

Bed 7                                                                                             

Private MD:                                                                                       

[facility] Physician Binh Porter                                                                      

HPI:                                                                                              

06/15                                                                                             

07:59 This 56 yrs old  Male presents to ER via Ambulatory with complaints of Hand    bhavani 

      Pain.                                                                                       

07:59 The patient or guardian reports decreased range of motion, pain. The complaints affect  bhavani 

      the right hand diffusely, palmar aspect of distal phalanx of right index finger and         

      palmar aspect of distal phalanx of right thumb. Context: The problem was sustained at       

      an unknown location, at a old injury. Onset: The symptoms/episode began/occurred 3          

      day(s) ago. Modifying factors: The symptoms are alleviated by elevation, the symptoms       

      are aggravated by movement, dependent position. Associated signs and symptoms: The          

      patient has no apparent associated signs or symptoms. Severity of symptoms: At their        

      worst the symptoms were mild, moderate, in the emergency department the symptoms are        

      unchanged. The patient has not experienced similar symptoms in the past.                    

                                                                                                  

Historical:                                                                                       

- Allergies:                                                                                      

07:06 No Known Allergies;                                                                     bb  

- Home Meds:                                                                                      

07:06 None [Active];                                                                          bb  

- PMHx:                                                                                           

07:06 Arthritis; COPD; Hepatitis C; neuropathy;                                               bb  

                                                                                                  

- Immunization history:: Adult Immunizations up to date.                                          

- Social history:: Smoking status: Patient/guardian denies using tobacco, the patient             

  reports quitting approximately 2 years ago, Patient uses alcohol, occasionally.                 

  Patient/guardian denies using street drugs.                                                     

- Family history:: not pertinent.                                                                 

                                                                                                  

                                                                                                  

ROS:                                                                                              

07:59 Constitutional: Negative for fever, chills, and weight loss, Eyes: Negative for injury, bhavani 

      pain, redness, and discharge, ENT: Negative for injury, pain, and discharge, Neck:          

      Negative for injury, pain, and swelling, Cardiovascular: Negative for chest pain,           

      palpitations, and edema, Respiratory: Negative for shortness of breath, cough,              

      wheezing, and pleuritic chest pain, Abdomen/GI: Negative for abdominal pain, nausea,        

      vomiting, diarrhea, and constipation, Back: Negative for injury and pain, : Negative      

      for injury, bleeding, discharge, and swelling, Skin: Negative for injury, rash, and         

      discoloration, Neuro: Negative for headache, weakness, numbness, tingling, and seizure,     

      Psych: Negative for depression, anxiety, suicide ideation, homicidal ideation, and          

      hallucinations, Allergy/Immunology: Negative for hives, rash, and allergies, Endocrine:     

      Negative for neck swelling, polydipsia, polyuria, polyphagia, and marked weight             

      changes, Hematologic/Lymphatic: Negative for swollen nodes, abnormal bleeding, and          

      unusual bruising.                                                                           

07:59 MS/extremity: Positive for decreased range of motion, pain, tenderness, of the palmar       

      aspect of distal phalanx of right index finger and palmar aspect of distal phalanx of       

      right thumb.                                                                                

                                                                                                  

Exam:                                                                                             

07:59 Constitutional:  This is a well developed, well nourished patient who is awake, alert,  bhavani 

      and in no acute distress. Head/Face:  Normocephalic, atraumatic. Eyes:  Pupils equal        

      round and reactive to light, extra-ocular motions intact.  Lids and lashes normal.          

      Conjunctiva and sclera are non-icteric and not injected.  Cornea within normal limits.      

      Periorbital areas with no swelling, redness, or edema. ENT:  Nares patent. No nasal         

      discharge, no septal abnormalities noted.  Tympanic membranes are normal and external       

      auditory canals are clear.  Oropharynx with no redness, swelling, or masses, exudates,      

      or evidence of obstruction, uvula midline.  Mucous membranes moist. Neck:  Trachea          

      midline, no thyromegaly or masses palpated, and no cervical lymphadenopathy.  Supple,       

      full range of motion without nuchal rigidity, or vertebral point tenderness.  No            

      Meningismus. Chest/axilla:  Normal chest wall appearance and motion.  Nontender with no     

      deformity.  No lesions are appreciated. Cardiovascular:  Regular rate and rhythm with a     

      normal S1 and S2.  No gallops, murmurs, or rubs.  Normal PMI, no JVD.  No pulse             

      deficits. Respiratory:  Lungs have equal breath sounds bilaterally, clear to                

      auscultation and percussion.  No rales, rhonchi or wheezes noted.  No increased work of     

      breathing, no retractions or nasal flaring. Abdomen/GI:  Soft, non-tender, with normal      

      bowel sounds.  No distension or tympany.  No guarding or rebound.  No evidence of           

      tenderness throughout. Back:  No spinal tenderness.  No costovertebral tenderness.          

      Full range of motion. Neuro:  Awake and alert, GCS 15, oriented to person, place, time,     

      and situation.  Cranial nerves II-XII grossly intact.  Motor strength 5/5 in all            

      extremities.  Sensory grossly intact.  Cerebellar exam normal.  Normal gait. Psych:         

      Awake, alert, with orientation to person, place and time.  Behavior, mood, and affect       

      are within normal limits.                                                                   

07:59 Musculoskeletal/extremity: ROM: intact in all extremities, full active range of motion,     

      full passive range of motion, limited active range of motion due to pain, limited           

      passive range of motion due to pain, Circulation is intact in all extremities.              

      Sensation intact. Compartment Syndrome exam of affected extremity: is normal.               

                                                                                                  

Vital Signs:                                                                                      

07:00  / 94; Pulse 76; Resp 18 S; Temp 97.9(O); Pulse Ox 99% on R/A; Weight 65.77 kg    bb  

      (R); Height 5 ft. 9 in. (175.26 cm) (R); Pain 8/10;                                         

07:00 Body Mass Index 21.41 (65.77 kg, 175.26 cm)                                             bb  

                                                                                                  

MDM:                                                                                              

07:16 Patient medically screened.                                                             [facility] 

08:01 Differential diagnosis: closed fracture, contusion, abrasion. Data reviewed: vital      bhavani 

      signs, nurses notes, radiologic studies, plain films. Data interpreted: Cardiac             

      monitor: rate is 76 beats/min, rhythm is regular, Pulse oximetry: is not applicable for     

      this patient encounter. Test interpretation: by ED physician or midlevel provider:          

      plain radiologic studies. Counseling: I had a detailed discussion with the patient          

      and/or guardian regarding: the historical points, exam findings, and any diagnostic         

      results supporting the discharge/admit diagnosis, lab results, radiology results, the       

      need for outpatient follow up, for definitive care, a hand specialist.                      

                                                                                                  

06/15                                                                                             

07:28 Order name: Hand Right 3 View XRAY                                                      bhavani 

                                                                                                  

Administered Medications:                                                                         

07:44 Drug: Bactroban (mupirocin) Ointment 2 % 1 application Route: Topical; Site: affected   ld1 

      area;                                                                                       

08:14 Follow up: Response: No adverse reaction                                                ld1 

07:44 Drug: Norco (HYDROcodone-acetaminophen) 5 mg-325 mg 1 tabs Route: PO;                   ld1 

08:14 Follow up: Response: No adverse reaction                                                ld1 

08:09 Drug: Aspirin 162 mg Route: PO;                                                         ld1 

08:14 Follow up: Response: No adverse reaction                                                ld1 

                                                                                                  

                                                                                                  

Disposition:                                                                                      

06/15/21 08:04 Discharged to Home. Impression: Pain in right finger(s) - index and thumb.         

- Condition is Stable.                                                                            

- Discharge Instructions: Musculoskeletal Pain, Aspirin and Your Heart.                           

- Prescriptions for gabapentin 300 mg Oral capsule - take 1 capsule by ORAL route 2               

  times per day; 30 capsule. Bactroban 2 % Topical Ointment - Apply to affected area 1            

  application by TOPICAL route every 12 hours; 15 gram. Tylenol- Codeine #3 300-30 mg             

  Oral Tablet - take 2 tablets by ORAL route every 4-6 hours As needed; 20 tablet.                

- Medication Reconciliation Form, Thank You Letter, Antibiotic Education, Prescription            

  Opioid Use form.                                                                                

- Follow up: Private Physician; When: 2 - 3 days; Reason: Recheck today's complaints,             

  Continuance of care, Re-evaluation by your physician. Follow up: Alli Estrada MD;            

  When: 2 - 3 days; Reason: Recheck today's complaints, Continuance of care,                      

  Re-evaluation by your physician.                                                                

- Problem is new.                                                                                 

- Symptoms have improved.                                                                         

                                                                                                  

                                                                                                  

                                                                                                  

Signatures:                                                                                       

Dispatcher [employer]                           EDMS                                                 

Binh Porter MD MD cha Ballard, Brenda, RN                     RN   bb                                                   

Kim Bolanos RN                     RN   ld1                                                  

                                                                                                  

Corrections: (The following items were deleted from the chart)                                    

08:15 08:04 06/15/2021 08:04 Discharged to Home. Impression: Pain in right finger(s) - index  ld1 

      and thumb. Condition is Stable. Forms are Medication Reconciliation Form, Thank You         

      Letter, Antibiotic Education, Prescription Opioid Use. Follow up: Private Physician;        

      When: 2 - 3 days; Reason: Recheck today's complaints, Continuance of care,                  

      Re-evaluation by your physician. Follow up: Alli Estrada; When: 2 - 3 days; Reason:        

      Recheck today's complaints, Continuance of care, Re-evaluation by your physician.           

      Problem is new. Symptoms have improved. bhavani                                                 

                                                                                                  

**************************************************************************************************